# Patient Record
Sex: FEMALE | Race: WHITE | NOT HISPANIC OR LATINO | URBAN - METROPOLITAN AREA
[De-identification: names, ages, dates, MRNs, and addresses within clinical notes are randomized per-mention and may not be internally consistent; named-entity substitution may affect disease eponyms.]

---

## 2018-11-13 ENCOUNTER — OUTPATIENT (OUTPATIENT)
Dept: OUTPATIENT SERVICES | Facility: HOSPITAL | Age: 73
LOS: 1 days | Discharge: HOME | End: 2018-11-13

## 2018-11-13 ENCOUNTER — TRANSCRIPTION ENCOUNTER (OUTPATIENT)
Age: 73
End: 2018-11-13

## 2018-12-11 ENCOUNTER — OUTPATIENT (OUTPATIENT)
Dept: OUTPATIENT SERVICES | Facility: HOSPITAL | Age: 73
LOS: 1 days | Discharge: HOME | End: 2018-12-11

## 2019-01-07 ENCOUNTER — APPOINTMENT (OUTPATIENT)
Dept: OTOLARYNGOLOGY | Facility: CLINIC | Age: 74
End: 2019-01-07
Payer: MEDICARE

## 2019-01-07 DIAGNOSIS — H90.3 SENSORINEURAL HEARING LOSS, BILATERAL: ICD-10-CM

## 2019-01-07 DIAGNOSIS — R49.8 OTHER VOICE AND RESONANCE DISORDERS: ICD-10-CM

## 2019-01-07 DIAGNOSIS — J31.0 CHRONIC RHINITIS: ICD-10-CM

## 2019-01-07 DIAGNOSIS — H93.11 TINNITUS, RIGHT EAR: ICD-10-CM

## 2019-01-07 PROCEDURE — 92550 TYMPANOMETRY & REFLEX THRESH: CPT

## 2019-01-07 PROCEDURE — 92557 COMPREHENSIVE HEARING TEST: CPT

## 2019-01-07 PROCEDURE — 99204 OFFICE O/P NEW MOD 45 MIN: CPT | Mod: 25

## 2019-01-07 PROCEDURE — 31575 DIAGNOSTIC LARYNGOSCOPY: CPT

## 2019-01-07 RX ORDER — IPRATROPIUM BROMIDE 21 UG/1
0.03 SPRAY NASAL 3 TIMES DAILY
Qty: 1 | Refills: 5 | Status: ACTIVE | COMMUNITY
Start: 2019-01-07 | End: 1900-01-01

## 2019-01-07 NOTE — PHYSICAL EXAM
[de-identified] : atropbhic rhinitis [Laryngoscopy Performed] : laryngoscopy was performed, see procedure section for findings [Normal] : no rashes

## 2019-01-07 NOTE — PROCEDURE
[Topical Lidocaine] : topical lidocaine [Oxymetazoline HCl] : oxymetazoline HCl [Flexible Endoscope] : examined with the flexible endoscope [Serial Number: ___] : Serial Number: [unfilled] [de-identified] : normal\par she wanted it done

## 2019-01-07 NOTE — REASON FOR VISIT
[Initial Evaluation] : an initial evaluation for [FreeTextEntry2] : hearing loss, tinnitus nasal drip

## 2019-01-07 NOTE — HISTORY OF PRESENT ILLNESS
[de-identified] : 74 yo woman with b progressive hl for which she has been taking annual caes for many years. No noise exposure. She felt it occurred suddenly in about 2009 and is severe. She does not want hearing aids. She has continuous high pitched tinnitus loudest in the r ear but in the past it was pulsatile and Dr Farr checked imaging studies she said were all negative. She also has chronic postnasal drip and wanted her nose and vocal cords examined. She has mild hoarseness but overuses her voice. Denies dysphagia odynophagia otalgia or aspiration.

## 2019-11-23 ENCOUNTER — TRANSCRIPTION ENCOUNTER (OUTPATIENT)
Age: 74
End: 2019-11-23

## 2019-11-27 ENCOUNTER — TRANSCRIPTION ENCOUNTER (OUTPATIENT)
Age: 74
End: 2019-11-27

## 2019-12-17 ENCOUNTER — APPOINTMENT (OUTPATIENT)
Dept: ORTHOPEDIC SURGERY | Facility: CLINIC | Age: 74
End: 2019-12-17

## 2020-01-15 ENCOUNTER — APPOINTMENT (OUTPATIENT)
Dept: OTOLARYNGOLOGY | Facility: CLINIC | Age: 75
End: 2020-01-15

## 2023-08-04 ENCOUNTER — OUTPATIENT (OUTPATIENT)
Dept: OUTPATIENT SERVICES | Facility: HOSPITAL | Age: 78
LOS: 1 days | End: 2023-08-04
Payer: MEDICARE

## 2023-08-04 DIAGNOSIS — J45.50 SEVERE PERSISTENT ASTHMA, UNCOMPLICATED: ICD-10-CM

## 2023-08-04 PROCEDURE — 94729 DIFFUSING CAPACITY: CPT

## 2023-08-04 PROCEDURE — 94060 EVALUATION OF WHEEZING: CPT

## 2023-08-04 PROCEDURE — 94729 DIFFUSING CAPACITY: CPT | Mod: 26

## 2023-08-04 PROCEDURE — 94760 N-INVAS EAR/PLS OXIMETRY 1: CPT

## 2023-08-04 PROCEDURE — 94726 PLETHYSMOGRAPHY LUNG VOLUMES: CPT | Mod: 26

## 2023-08-04 PROCEDURE — 94726 PLETHYSMOGRAPHY LUNG VOLUMES: CPT

## 2023-08-04 PROCEDURE — 94010 BREATHING CAPACITY TEST: CPT | Mod: 26

## 2023-08-08 ENCOUNTER — APPOINTMENT (OUTPATIENT)
Dept: PULMONOLOGY | Facility: CLINIC | Age: 78
End: 2023-08-08
Payer: MEDICARE

## 2023-08-08 VITALS
OXYGEN SATURATION: 95 % | BODY MASS INDEX: 20.77 KG/M2 | SYSTOLIC BLOOD PRESSURE: 110 MMHG | HEART RATE: 75 BPM | WEIGHT: 110 LBS | DIASTOLIC BLOOD PRESSURE: 61 MMHG | HEIGHT: 61 IN

## 2023-08-08 PROCEDURE — 99204 OFFICE O/P NEW MOD 45 MIN: CPT

## 2023-08-09 NOTE — HISTORY OF PRESENT ILLNESS
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 flovent-  ligvhahni trelegy spray for nose had on 60mg of predniosen by dr. 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  pfts show normla diffusion, low ration but only mild fev1 decrease,  normla rv, but able to get a large fvc out by virtue of a large ERV  she breathes at a high lung volume  the shape of the loop is more in keeping with copdwith a very rapid drop off of flow  there was almost a bd response slightly below 200cc splitting hairs  avm  until ct arrived by neighbor then past few months. doors face each other.   animals in general allergic to cats/ dust mites.  has a predisposition to avm from some sort blood disorder nutrional supplement nasal spray

## 2023-08-09 NOTE — REASON FOR VISIT
[Initial] : an initial visit [TextBox_44] : patient here with difficult to define obstructive lung disease

## 2023-08-09 NOTE — DISCUSSION/SUMMARY
[FreeTextEntry1] : no matter how much I asked her i could not get a good idea of how much she smoked, seemed like varying between social smoking and period of more regular smoking  not clear how to differentiate between lung trajectory and remodeling smoking certainly must have contributed as well.

## 2023-08-15 ENCOUNTER — TRANSCRIPTION ENCOUNTER (OUTPATIENT)
Age: 78
End: 2023-08-15

## 2023-08-20 ENCOUNTER — NON-APPOINTMENT (OUTPATIENT)
Age: 78
End: 2023-08-20

## 2023-08-21 ENCOUNTER — TRANSCRIPTION ENCOUNTER (OUTPATIENT)
Age: 78
End: 2023-08-21

## 2023-08-29 ENCOUNTER — TRANSCRIPTION ENCOUNTER (OUTPATIENT)
Age: 78
End: 2023-08-29

## 2024-01-23 ENCOUNTER — APPOINTMENT (OUTPATIENT)
Dept: ORTHOPEDIC SURGERY | Facility: CLINIC | Age: 79
End: 2024-01-23

## 2024-09-30 ENCOUNTER — APPOINTMENT (OUTPATIENT)
Dept: DERMATOLOGY | Facility: CLINIC | Age: 79
End: 2024-09-30